# Patient Record
Sex: MALE | Race: BLACK OR AFRICAN AMERICAN | NOT HISPANIC OR LATINO | URBAN - METROPOLITAN AREA
[De-identification: names, ages, dates, MRNs, and addresses within clinical notes are randomized per-mention and may not be internally consistent; named-entity substitution may affect disease eponyms.]

---

## 2020-02-04 ENCOUNTER — EMERGENCY (EMERGENCY)
Facility: HOSPITAL | Age: 55
LOS: 1 days | Discharge: HOME | End: 2020-02-04
Attending: EMERGENCY MEDICINE | Admitting: EMERGENCY MEDICINE
Payer: SELF-PAY

## 2020-02-04 VITALS
OXYGEN SATURATION: 98 % | SYSTOLIC BLOOD PRESSURE: 126 MMHG | DIASTOLIC BLOOD PRESSURE: 80 MMHG | HEIGHT: 68 IN | WEIGHT: 184.97 LBS | RESPIRATION RATE: 18 BRPM | TEMPERATURE: 99 F | HEART RATE: 75 BPM

## 2020-02-04 PROCEDURE — 99284 EMERGENCY DEPT VISIT MOD MDM: CPT

## 2020-02-04 NOTE — ED ADULT NURSE NOTE - OBJECTIVE STATEMENT
Patient to ED c/o back pain radiating to buttocks and legs x 4-5 days. Pain is worse with walking. Denies any recent heavy lifting or falls. Pt is ambulating with cane in the ED which was prescribed for chronic leg reyes

## 2020-02-04 NOTE — ED PROVIDER NOTE - CLINICAL SUMMARY MEDICAL DECISION MAKING FREE TEXT BOX
53 y/o M presents to the ED c/o back pain radiating to buttocks and legs. Normal gait on exam. Offered to send steroids, muscle relaxers, and antiinflammatories to pharmacy, yet pt left ED s/p exam stating he did not need anything. 53 y/o M presents to the ED c/o back pain radiating to buttocks and legs. Normal gait on exam. Offered to send steroids, muscle relaxers, and antiinflammatories to pharmacy, yet pt left ED s/p exam stating he did not need anything- extensive discussion with patient explaining the care plan- px seemed satisfied- then walked out of the ED saying he did not want the pills

## 2020-02-04 NOTE — ED PROVIDER NOTE - OBJECTIVE STATEMENT
53 y/o M with hx of anxiety on Wellbutrin presents to the ED c/o back pain radiating to buttocks and legs x 4-5 days. Pain is worse with walking. Denies any recent heavy lifting or falls. Pt is ambulating with cane in the ED. Denies numbness, tingling, chest pain, shortness of breath, abdominal pain, dysuria, hematuria. 53 y/o M with hx of anxiety on Wellbutrin presents to the ED c/o back pain radiating to buttocks and legs x 4-5 days. Pain is worse with walking. Denies any recent heavy lifting or falls. Pt is ambulating with cane in the ED which was prescribed for chronic leg pain. Denies numbness, tingling, chest pain, shortness of breath, abdominal pain, dysuria, hematuria. no bowel/bladder incont  stable gait only tylenol taken for pain

## 2020-02-04 NOTE — ED PROVIDER NOTE - PATIENT PORTAL LINK FT
You can access the FollowMyHealth Patient Portal offered by [facility] by registering at the following website: [email or web address]. By joining [employer]’s FollowMyHealth portal, you will also be able to view your health information using other applications (apps) compatible with our system.

## 2020-02-04 NOTE — ED PROVIDER NOTE - CHPI ED SYMPTOMS NEG
no numbness/no tingling/no chest pain, no shortness of breath, no abdominal pain, no dysuria, no hematuria

## 2020-02-04 NOTE — ED ADULT TRIAGE NOTE - CHIEF COMPLAINT QUOTE
pt c/o lower back pain radiating to bilateral legs x4 days- denies injury, reports pain is exacerbated by movement

## 2020-02-04 NOTE — ED PROVIDER NOTE - NSFOLLOWUPINSTRUCTIONS_ED_ALL_ED_FT
Radicular Pain  Radicular pain is a type of pain that spreads from your back or neck along a spinal nerve. Spinal nerves are nerves that leave the spinal cord and go to the muscles. Radicular pain is sometimes called radiculopathy, radiculitis, or a pinched nerve. When you have this type of pain, you may also have weakness, numbness, or tingling in the area of your body that is supplied by the nerve. The pain may feel sharp and burning. Depending on which spinal nerve is affected, the pain may occur in the:  Neck area (cervical radicular pain). You may also feel pain, numbness, weakness, or tingling in the arms.Mid-spine area (thoracic radicular pain). You would feel this pain in the back and chest. This type is rare.Lower back area (lumbar radicular pain). You would feel this pain as low back pain. You may feel pain, numbness, weakness, or tingling in the buttocks or legs. Sciatica is a type of lumbar radicular pain that shoots down the back of the leg.Radicular pain occurs when one of the spinal nerves becomes irritated or squeezed (compressed). It is often caused by something pushing on a spinal nerve, such as one of the bones of the spine (vertebrae) or one of the round cushions between vertebrae (intervertebral disks). This can result from:  An injury. Wear and tear or aging of a disk. The growth of a bone spur that pushes on the nerve.Radicular pain often goes away when you follow instructions from your health care provider for relieving pain at home.  Follow these instructions at home:  Managing pain               If directed, put ice on the affected area:  Put ice in a plastic bag.Place a towel between your skin and the bag.Leave the ice on for 20 minutes, 2–3 times a day.If directed, apply heat to the affected area as often as told by your health care provider. Use the heat source that your health care provider recommends, such as a moist heat pack or a heating pad.  Place a towel between your skin and the heat source.Leave the heat on for 20–30 minutes.Remove the heat if your skin turns bright red. This is especially important if you are unable to feel pain, heat, or cold. You may have a greater risk of getting burned.Activity        Do not sit or rest in bed for long periods of time.Try to stay as active as possible. Ask your health care provider what type of exercise or activity is best for you.Avoid activities that make your pain worse, such as bending and lifting.Do not lift anything that is heavier than 10 lb (4.5 kg), or the limit that you are told, until your health care provider says that it is safe.Practice using proper technique when lifting items. Proper lifting technique involves bending your knees and rising up.Do strength and range-of-motion exercises only as told by your health care provider or physical therapist.General instructions     Take over-the-counter and prescription medicines only as told by your health care provider.Pay attention to any changes in your symptoms.Keep all follow-up visits as told by your health care provider. This is important.  Your health care provider may send you to a physical therapist to help with this pain.Contact a health care provider if:  Your pain and other symptoms get worse.Your pain medicine is not helping.Your pain has not improved after a few weeks of home care.You have a fever.Get help right away if:  You have severe pain, weakness, or numbness.You have difficulty with bladder or bowel control.Summary  Radicular pain is a type of pain that spreads from your back or neck along a spinal nerve.When you have radicular pain, you may also have weakness, numbness, or tingling in the area of your body that is supplied by the nerve.The pain may feel sharp or burning.Radicular pain may be treated with ice, heat, medicines, or physical therapy.This information is not intended to replace advice given to you by your health care provider. Make sure you discuss any questions you have with your health care provider.    Document Released: 01/25/2006 Document Revised: 07/02/2019 Document Reviewed: 07/02/2019  [employer] Patient Education © 2019 [employer]

## 2020-02-10 DIAGNOSIS — Z87.891 PERSONAL HISTORY OF NICOTINE DEPENDENCE: ICD-10-CM

## 2020-02-10 DIAGNOSIS — M54.5 LOW BACK PAIN: ICD-10-CM

## 2020-02-10 DIAGNOSIS — M54.9 DORSALGIA, UNSPECIFIED: ICD-10-CM

## 2024-04-18 ENCOUNTER — EMERGENCY (EMERGENCY)
Facility: HOSPITAL | Age: 59
LOS: 1 days | Discharge: HOME | End: 2024-04-18
Attending: EMERGENCY MEDICINE | Admitting: EMERGENCY MEDICINE
Payer: SELF-PAY

## 2024-04-18 VITALS
HEART RATE: 91 BPM | DIASTOLIC BLOOD PRESSURE: 79 MMHG | SYSTOLIC BLOOD PRESSURE: 112 MMHG | TEMPERATURE: 98 F | RESPIRATION RATE: 16 BRPM | WEIGHT: 190.04 LBS | OXYGEN SATURATION: 96 %

## 2024-04-18 PROCEDURE — 99283 EMERGENCY DEPT VISIT LOW MDM: CPT

## 2024-04-18 NOTE — ED PROVIDER NOTE - CLINICAL SUMMARY MEDICAL DECISION MAKING FREE TEXT BOX
Pt states he fell out after using fentanyl.  States he had not used in 4 months but used today.  No head injury, no CP, no SOB.    Patient with steady gait, oriented to person place and time of day and states he has Narcan kits at home.  SBIRT offered.

## 2024-04-18 NOTE — ED ADULT NURSE NOTE - NSFALLUNIVINTERV_ED_ALL_ED
Bed/Stretcher in lowest position, wheels locked, appropriate side rails in place/Call bell, personal items and telephone in reach/Instruct patient to call for assistance before getting out of bed/chair/stretcher/Non-slip footwear applied when patient is off stretcher/[place] to call system/Physically safe environment - no spills, clutter or unnecessary equipment/Purposeful proactive rounding/Room/bathroom lighting operational, light cord in reach

## 2024-04-18 NOTE — ED ADULT NURSE NOTE - OBJECTIVE STATEMENT
Pt is a 58y male c/o overdose. Pt was given narcan via IVP 0.5 mg by EMS PTA. admits to fentanyl use. arrives to ed AAOx4, even respirations. was initially nauseous but reports improvement after vomiting and denies nausea currently. Ambulatory w/ steady gait. Denies pain.

## 2024-04-18 NOTE — ED ADULT TRIAGE NOTE - CHIEF COMPLAINT QUOTE
BIBEMS from adult store for overdose. was given narcan via IVP 0.5 mg IV. admits to fentanyl use. arrives to ed awake and alert, even respirations. was initially nauseous but reports improvement after vomiting and denies nausea currently  abrasion noted to right shoulder, pt denies head pain

## 2024-04-18 NOTE — ED PROVIDER NOTE - PATIENT PORTAL LINK FT
You can access the FollowMyHealth Patient Portal offered by [facility] by registering at the following website: [email or web address]. By joining [employer]’s FollowMyHealth portal, you will also be able to view your health information using other applications (apps) compatible with our system.

## 2024-04-18 NOTE — ED PROVIDER NOTE - OBJECTIVE STATEMENT
Pt states he fell out after using fentanyl.  States he had not used in 4 months but used today.  No head injury, no CP, no SOB.

## 2024-04-19 PROBLEM — F41.9 ANXIETY DISORDER, UNSPECIFIED: Chronic | Status: ACTIVE | Noted: 2020-02-04

## 2024-04-22 DIAGNOSIS — R41.82 ALTERED MENTAL STATUS, UNSPECIFIED: ICD-10-CM

## 2024-04-22 DIAGNOSIS — T40.411A POISONING BY FENTANYL OR FENTANYL ANALOGS, ACCIDENTAL (UNINTENTIONAL), INITIAL ENCOUNTER: ICD-10-CM

## 2024-05-23 ENCOUNTER — APPOINTMENT (OUTPATIENT)
Dept: ORTHOPEDIC SURGERY | Facility: CLINIC | Age: 59
End: 2024-05-23

## 2024-05-23 PROBLEM — Z00.00 ENCOUNTER FOR PREVENTIVE HEALTH EXAMINATION: Status: ACTIVE | Noted: 2024-05-23

## 2024-05-30 ENCOUNTER — APPOINTMENT (OUTPATIENT)
Dept: ORTHOPEDIC SURGERY | Facility: CLINIC | Age: 59
End: 2024-05-30

## 2024-05-30 ENCOUNTER — RESULT REVIEW (OUTPATIENT)
Age: 59
End: 2024-05-30

## 2024-05-30 ENCOUNTER — OUTPATIENT (OUTPATIENT)
Dept: OUTPATIENT SERVICES | Facility: HOSPITAL | Age: 59
LOS: 1 days | End: 2024-05-30
Payer: MEDICAID

## 2024-05-30 VITALS
OXYGEN SATURATION: 99 % | DIASTOLIC BLOOD PRESSURE: 88 MMHG | HEART RATE: 60 BPM | SYSTOLIC BLOOD PRESSURE: 149 MMHG | HEIGHT: 68 IN | WEIGHT: 194 LBS | BODY MASS INDEX: 29.4 KG/M2

## 2024-05-30 DIAGNOSIS — Z87.891 PERSONAL HISTORY OF NICOTINE DEPENDENCE: ICD-10-CM

## 2024-05-30 DIAGNOSIS — F17.200 NICOTINE DEPENDENCE, UNSPECIFIED, UNCOMPLICATED: ICD-10-CM

## 2024-05-30 PROCEDURE — 72110 X-RAY EXAM L-2 SPINE 4/>VWS: CPT | Mod: 26

## 2024-05-30 PROCEDURE — 99202 OFFICE O/P NEW SF 15 MIN: CPT

## 2024-05-30 PROCEDURE — 72110 X-RAY EXAM L-2 SPINE 4/>VWS: CPT

## 2024-05-30 NOTE — HISTORY OF PRESENT ILLNESS
[de-identified] : 58-year-old M with Hx of rhabdomyolysis this year requiring 1 month of dialysis  presenting today with back pain w/ no sydnie x1 month Patient at this time denies radicular pain  Patient at baseline ambulates without assistive devices  Currently denies numbness tingling down legs, denies radiating pain. Denies saddle anesthesia, denies incontinence of bladder and bowels.   PMHx -  Denies PSx -  Denies Social (-)Tobacco (-)EtOh (-)Elicit substance

## 2024-05-30 NOTE — PHYSICAL EXAM
[de-identified] : General: Pt Alert and oriented, NAD  ttp - paraspinous muscles Bilat LE Sensation: SILT L2-S1  Motor:  L2 (hip flexion)  5/5   L3 (knee extension)  5/5   L4 (ankle dorsiflexion)  5/5   L5 (long toe extension) 5/5   S1 (ankle plantar flexion) 4/5

## 2024-05-30 NOTE — ASSESSMENT
[FreeTextEntry1] : 58 w/ Low back mostly likely due to muscle spasms  -No acute orthopedic surgical intervention at this time -Conservative tx -Flexeril -Meloxicam -F/u in 4 weeks to monitor for improvement

## 2025-05-17 ENCOUNTER — EMERGENCY (EMERGENCY)
Facility: HOSPITAL | Age: 60
LOS: 1 days | End: 2025-05-17
Attending: EMERGENCY MEDICINE | Admitting: EMERGENCY MEDICINE
Payer: MEDICAID

## 2025-05-17 VITALS
TEMPERATURE: 97 F | RESPIRATION RATE: 16 BRPM | DIASTOLIC BLOOD PRESSURE: 86 MMHG | HEART RATE: 98 BPM | SYSTOLIC BLOOD PRESSURE: 123 MMHG | OXYGEN SATURATION: 96 %

## 2025-05-17 PROCEDURE — 70450 CT HEAD/BRAIN W/O DYE: CPT | Mod: 26

## 2025-05-17 PROCEDURE — 99284 EMERGENCY DEPT VISIT MOD MDM: CPT | Mod: 25

## 2025-05-17 PROCEDURE — 70486 CT MAXILLOFACIAL W/O DYE: CPT | Mod: 26

## 2025-05-17 PROCEDURE — 72125 CT NECK SPINE W/O DYE: CPT | Mod: 26

## 2025-05-17 RX ORDER — ONDANSETRON HCL/PF 4 MG/2 ML
4 VIAL (ML) INJECTION ONCE
Refills: 0 | Status: COMPLETED | OUTPATIENT
Start: 2025-05-17 | End: 2025-05-17

## 2025-05-17 RX ORDER — BACITRACIN 500 UNIT/G
1 OINTMENT (GRAM) TOPICAL ONCE
Refills: 0 | Status: COMPLETED | OUTPATIENT
Start: 2025-05-17 | End: 2025-05-17

## 2025-05-17 RX ORDER — ACETAMINOPHEN 500 MG/5ML
975 LIQUID (ML) ORAL ONCE
Refills: 0 | Status: COMPLETED | OUTPATIENT
Start: 2025-05-17 | End: 2025-05-17

## 2025-05-17 RX ORDER — CLOSTRIDIUM TETANI TOXOID ANTIGEN (FORMALDEHYDE INACTIVATED), CORYNEBACTERIUM DIPHTHERIAE TOXOID ANTIGEN (FORMALDEHYDE INACTIVATED), BORDETELLA PERTUSSIS TOXOID ANTIGEN (GLUTARALDEHYDE INACTIVATED), BORDETELLA PERTUSSIS FILAMENTOUS HEMAGGLUTININ ANTIGEN (FORMALDEHYDE INACTIVATED), BORDETELLA PERTUSSIS PERTACTIN ANTIGEN, AND BORDETELLA PERTUSSIS FIMBRIAE 2/3 ANTIGEN 5; 2; 2.5; 5; 3; 5 [LF]/.5ML; [LF]/.5ML; UG/.5ML; UG/.5ML; UG/.5ML; UG/.5ML
0.5 INJECTION, SUSPENSION INTRAMUSCULAR ONCE
Refills: 0 | Status: COMPLETED | OUTPATIENT
Start: 2025-05-17 | End: 2025-05-17

## 2025-05-17 RX ADMIN — Medication 4 MILLIGRAM(S): at 05:48

## 2025-05-17 RX ADMIN — Medication 1 APPLICATION(S): at 05:48

## 2025-05-17 RX ADMIN — Medication 975 MILLIGRAM(S): at 03:37

## 2025-05-17 RX ADMIN — CLOSTRIDIUM TETANI TOXOID ANTIGEN (FORMALDEHYDE INACTIVATED), CORYNEBACTERIUM DIPHTHERIAE TOXOID ANTIGEN (FORMALDEHYDE INACTIVATED), BORDETELLA PERTUSSIS TOXOID ANTIGEN (GLUTARALDEHYDE INACTIVATED), BORDETELLA PERTUSSIS FILAMENTOUS HEMAGGLUTININ ANTIGEN (FORMALDEHYDE INACTIVATED), BORDETELLA PERTUSSIS PERTACTIN ANTIGEN, AND BORDETELLA PERTUSSIS FIMBRIAE 2/3 ANTIGEN 0.5 MILLILITER(S): 5; 2; 2.5; 5; 3; 5 INJECTION, SUSPENSION INTRAMUSCULAR at 03:37

## 2025-05-17 NOTE — ED ADULT TRIAGE NOTE - CHIEF COMPLAINT QUOTE
Pt BIBA c/o drug overdose. Pt admits to fentanyl use tonight. Pt arrives with abrasion to forehead and back of head. Per EMS, pt was unconscious and decr respirations. Pt received 2mg IN narcan and 1mg IV narcan with +response. Pt also endorsing abdominal pain and neck pain. Denies blood thinner use. C-collar in place by EMS. Pt has 18g to left AC placed prehospital.

## 2025-05-17 NOTE — ED PROVIDER NOTE - CARE PLAN
1 Principal Discharge DX:	Overdose of fentanyl  Secondary Diagnosis:	Scalp contusion  Secondary Diagnosis:	Neck sprain  Secondary Diagnosis:	Lung nodule

## 2025-05-17 NOTE — ED PROVIDER NOTE - ATTENDING APP SHARED VISIT CONTRIBUTION OF CARE
59-year-old male with a history of hypertension and substance use was brought in by emergency services for altered mental status and trauma after using fentanyl. The patient responded positively to administered naloxone. He presented with abrasions on the forehead and back of the head but reported no other injuries or symptoms. Physical examination revealed tenderness in the scalp and neck. Further imaging showed no acute intracranial pathology or acute fractures but identified a lung nodule and emphysema. He was monitored for stability and improved under treatment, including receiving acetaminophen and diphtheria/tetanus/pertussis vaccine. He was discharged with a narcan kit and follow-up instructions to address his lung nodule and substance abuse.    I saw and evaluated the patient. I discussed the case with the BENJAMIN and agree with the findings and helped develop the plan of care as documented in the BENJAMIN’s note. I agree with the findings and plan of care as documented in the BENJAMIN’s note.

## 2025-05-17 NOTE — ED PROVIDER NOTE - NSFOLLOWUPINSTRUCTIONS_ED_ALL_ED_FT
Substance Abuse    Chemical dependency is an addiction to drugs or alcohol. It is characterized by the repeated behavior of seeking out and using drugs and alcohol despite harmful consequences to the health and safety of oneself and others. Using drugs in a manner that brought you to an Emergency Room suggests you may have an drug abuse problem. Seek help at a drug addiction center.    SEEK IMMEDIATE MEDICAL CARE IF YOU HAVE ANY OF THE FOLLOWING SYMPTOMS: chest pain, shortness of breath, change in mental status, thoughts about hurting killing yourself, thoughts about hurting or killing somebody else, hallucinations, or worsening depression. Substance Abuse    Chemical dependency is an addiction to drugs or alcohol. It is characterized by the repeated behavior of seeking out and using drugs and alcohol despite harmful consequences to the health and safety of oneself and others. Using drugs in a manner that brought you to an Emergency Room suggests you may have an drug abuse problem. Seek help at a drug addiction center.    SEEK IMMEDIATE MEDICAL CARE IF YOU HAVE ANY OF THE FOLLOWING SYMPTOMS: chest pain, shortness of breath, change in mental status, thoughts about hurting killing yourself, thoughts about hurting or killing somebody else, hallucinations, or worsening depression.    Lung nodules are small growths or spots in the lungs that are often found incidentally during imaging studies. They can vary in size, shape, and density and may result from various conditions, including infections, inflammations, or neoplasms. Discharge instructions for lung nodules focus on monitoring and determining the nature of these nodules, whether benign or potentially malignant. Here are general instructions you might receive:    Monitoring and Follow-Up:    Attend all scheduled follow-up appointments with your healthcare provider to evaluate the lung nodules. These may include repeat imaging studies, such as chest X-rays or CT scans, to monitor any changes in size or appearance.  Discuss any further diagnostic tests, such as a PET scan or a biopsy, which your healthcare provider may recommend for more detailed evaluation.  Report Symptoms:    Be vigilant about new or worsening symptoms, such as persistent cough, chest pain, shortness of breath, unexplained weight loss, or fatigue. Report these to your healthcare provider promptly.  Keep track of any respiratory changes, even if they seem minor, and discuss them during follow-up visits.  Lifestyle and Health Management:    Avoid smoking and exposure to secondhand smoke, as tobacco use can worsen lung conditions and impact lung health.  Maintain a healthy lifestyle, including a balanced diet and regular exercise, to support overall lung function and health.  Follow any specific recommendations given by your healthcare provider to manage underlying health conditions that may relate to lung health.  Medication Adherence:    Take any prescribed medications to manage related symptoms or conditions, such as inhalers for asthma or antibiotics for infections, as directed.  Specialist Referral:    You may be referred to a pulmonologist (lung specialist) or an oncologist if further evaluation or specialized treatment is necessary.  Avoid Respiratory Irritants:    Minimize exposure to environmental pollutants, chemical fumes, or allergens that could aggravate your lungs.

## 2025-05-17 NOTE — ED PROVIDER NOTE - CLINICAL SUMMARY MEDICAL DECISION MAKING FREE TEXT BOX
60 yo M with PMHx of HTN, substance use, not on any AC/ASA, last tetanus unknown, BIBA for AMS and trauma. Pt admits to fentanyl use tonight, found bradypneic with AMS and trauma with unknown mechanism. Pt arrives with abrasion to forehead and back of head, Pt received 2mg IN narcan and 1mg IV narcan with appropriate response.   FS wnl, pt  fully disrobed and noted isolated face/head/neck trauma, CT with no acute fx or ICH noted. incidental findings of a 1.3 x 0.8 cm nodule right lung apex.   Biapical paraseptal emphysema. Results, ddx, incidental findings, and f/u plans discussed in length with pt at bedside. AFVSS, pt is non-toxic appearing and has remained stable throughout the stay after ED interventions. monitored in the ED with stable mental status and clinically sober for dc.drug cessation and counseling provided, given narcan kit, transportation established for safe discharge home. prompt return to ED for any worsening or new sx, pt verbalized understanding.

## 2025-05-17 NOTE — ED PROVIDER NOTE - PHYSICAL EXAMINATION
Gen - WDWN, NAD, comfortable and non-toxic appearing, speaking in full sentences, phonating well   Skin - warm, dry, +abrasion to the L forehead and scalp region, no laceration noted   HEENT - NC, +scalp hematoma on the L, TTP, no crepitus, PERRL, pupils symmetric b/l, 3mm b/l, MMM, no nasal discharge, +TTP, airway patent, neck supple with paraspinal tenderness in the cervical region   CV - S1S2, R/R/R  Resp - CTAB, no r/r/w  GI - soft, ND, NT, no CVAT b/l   MS - W/W/P, no C/C/E, No acute or gross deformities noted to extremities. No midline spinal tenderness or step off on palpation  Neuro - AxOx3, no focal neuro deficits, unable to ambulate due to clinical condition currently

## 2025-05-17 NOTE — ED PROVIDER NOTE - OBJECTIVE STATEMENT
60 yo M with PMHx of HTN, substance use, not on any AC/ASA, last tetanus unknown, BIBA for AMS and trauma. Pt admits to fentanyl use tonight, found bradypneic with AMS and trauma with unknown mechanism. Pt arrives with abrasion to forehead and back of head, Pt received 2mg IN narcan and 1mg IV narcan with appropriate response. 60 yo M with PMHx of HTN, substance use, not on any AC/ASA, last tetanus unknown, BIBA for AMS and trauma. Pt admits to fentanyl use tonight, found bradypneic with AMS and trauma with unknown mechanism. Pt arrives with abrasion to forehead and back of head, Pt received 2mg IN narcan and 1mg IV narcan with appropriate response. Reports pain to the neck and face region. Denies fever, chills, LOC, CP, SOB, palpitations, N/V/D/C, abdominal pain, focal weakness, incontinence, seizure activities, rash, and paresthesia.  No other co-ingestion reported

## 2025-05-17 NOTE — ED PROVIDER NOTE - CARE PROVIDERS DIRECT ADDRESSES
[email or web address]

## 2025-05-17 NOTE — ED PROVIDER NOTE - CARE PROVIDER_API CALL
[facility]  Pulmonary Disease  [address] [place] [id number]  Phone: [phone]  Fax: [phone]  Follow Up Time:     Jairo Kwong  Internal Medicine  [address] [place] [address]  Phone: [phone]  Fax: [phone]  Follow Up Time:

## 2025-05-17 NOTE — ED PROVIDER NOTE - PATIENT PORTAL LINK FT
You can access the FollowMyHealth Patient Portal offered by [facility] by registering at the following website: [email or web address]. By joining [employer]’s FollowMyHealth portal, you will also be able to view your health information using other applications (apps) compatible with our system.

## 2025-05-17 NOTE — ED PROVIDER NOTE - PROGRESS NOTE DETAILS
Pt reassessed at bedside with no new or relapse of sx noted. Resting comfortably with no acute distress noted. ambulatory in the ED with steady gait  Pt's old chart - alternative MRN [id number]

## 2025-05-20 DIAGNOSIS — T40.411A POISONING BY FENTANYL OR FENTANYL ANALOGS, ACCIDENTAL (UNINTENTIONAL), INITIAL ENCOUNTER: ICD-10-CM

## 2025-05-20 DIAGNOSIS — S00.03XA CONTUSION OF SCALP, INITIAL ENCOUNTER: ICD-10-CM

## 2025-05-20 DIAGNOSIS — R41.82 ALTERED MENTAL STATUS, UNSPECIFIED: ICD-10-CM
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
## 2025-05-20 DIAGNOSIS — I10 ESSENTIAL (PRIMARY) HYPERTENSION: ICD-10-CM

## 2025-05-20 DIAGNOSIS — R91.1 SOLITARY PULMONARY NODULE: ICD-10-CM

## 2025-05-20 DIAGNOSIS — S13.9XXA SPRAIN OF JOINTS AND LIGAMENTS OF UNSPECIFIED PARTS OF NECK, INITIAL ENCOUNTER: ICD-10-CM
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
## 2025-05-20 DIAGNOSIS — Z23 ENCOUNTER FOR IMMUNIZATION: ICD-10-CM

## 2025-05-20 DIAGNOSIS — S00.81XA ABRASION OF OTHER PART OF HEAD, INITIAL ENCOUNTER: ICD-10-CM

## 2025-06-02 ENCOUNTER — APPOINTMENT (OUTPATIENT)
Dept: PULMONOLOGY | Facility: CLINIC | Age: 60
End: 2025-06-02
Payer: MEDICAID

## 2025-06-02 ENCOUNTER — NON-APPOINTMENT (OUTPATIENT)
Age: 60
End: 2025-06-02

## 2025-06-02 VITALS
HEIGHT: 64 IN | BODY MASS INDEX: 31.58 KG/M2 | SYSTOLIC BLOOD PRESSURE: 104 MMHG | TEMPERATURE: 98.4 F | WEIGHT: 185 LBS | OXYGEN SATURATION: 99 % | HEART RATE: 78 BPM | DIASTOLIC BLOOD PRESSURE: 71 MMHG

## 2025-06-02 DIAGNOSIS — F17.200 NICOTINE DEPENDENCE, UNSPECIFIED, UNCOMPLICATED: ICD-10-CM

## 2025-06-02 PROCEDURE — 99406 BEHAV CHNG SMOKING 3-10 MIN: CPT

## 2025-06-02 PROCEDURE — G0296 VISIT TO DETERM LDCT ELIG: CPT

## 2025-06-02 PROCEDURE — 99204 OFFICE O/P NEW MOD 45 MIN: CPT | Mod: 25

## 2025-06-02 RX ORDER — NICOTINE 21 MG/24HR
14 PATCH, TRANSDERMAL 24 HOURS TRANSDERMAL DAILY
Qty: 28 | Refills: 2 | Status: ACTIVE | COMMUNITY
Start: 2025-06-02 | End: 1900-01-01

## 2025-06-02 RX ORDER — AMLODIPINE BESYLATE 10 MG/1
10 TABLET ORAL
Refills: 0 | Status: ACTIVE | COMMUNITY

## 2025-06-02 RX ORDER — LORATADINE 10 MG/1
CAPSULE, LIQUID FILLED ORAL
Refills: 0 | Status: ACTIVE | COMMUNITY

## 2025-06-02 RX ORDER — HYDRALAZINE HYDROCHLORIDE 25 MG/1
25 TABLET ORAL
Refills: 0 | Status: ACTIVE | COMMUNITY

## 2025-06-02 RX ORDER — ARIPIPRAZOLE 20 MG/1
20 TABLET ORAL
Refills: 0 | Status: ACTIVE | COMMUNITY
Start: 2025-06-02

## 2025-06-05 ENCOUNTER — NON-APPOINTMENT (OUTPATIENT)
Age: 60
End: 2025-06-05

## 2025-06-05 VITALS — WEIGHT: 188 LBS | BODY MASS INDEX: 28.49 KG/M2 | HEIGHT: 68 IN

## 2025-06-05 DIAGNOSIS — F17.210 NICOTINE DEPENDENCE, CIGARETTES, UNCOMPLICATED: ICD-10-CM

## 2025-06-06 ENCOUNTER — TRANSCRIPTION ENCOUNTER (OUTPATIENT)
Age: 60
End: 2025-06-06

## 2025-06-19 ENCOUNTER — APPOINTMENT (OUTPATIENT)
Dept: CT IMAGING | Facility: HOSPITAL | Age: 60
End: 2025-06-19

## 2025-06-19 ENCOUNTER — OUTPATIENT (OUTPATIENT)
Dept: OUTPATIENT SERVICES | Facility: HOSPITAL | Age: 60
LOS: 1 days | End: 2025-06-19
Payer: MEDICAID

## 2025-06-19 PROCEDURE — 71271 CT THORAX LUNG CANCER SCR C-: CPT | Mod: 26

## 2025-06-19 PROCEDURE — 71271 CT THORAX LUNG CANCER SCR C-: CPT

## 2025-06-23 ENCOUNTER — APPOINTMENT (OUTPATIENT)
Dept: PULMONOLOGY | Facility: CLINIC | Age: 60
End: 2025-06-23
Payer: MEDICAID

## 2025-06-23 VITALS
SYSTOLIC BLOOD PRESSURE: 124 MMHG | DIASTOLIC BLOOD PRESSURE: 84 MMHG | TEMPERATURE: 98.5 F | HEIGHT: 68 IN | WEIGHT: 193 LBS | OXYGEN SATURATION: 98 % | BODY MASS INDEX: 29.25 KG/M2 | HEART RATE: 69 BPM

## 2025-06-23 PROCEDURE — 94726 PLETHYSMOGRAPHY LUNG VOLUMES: CPT

## 2025-06-23 PROCEDURE — ZZZZZ: CPT

## 2025-06-23 PROCEDURE — 94729 DIFFUSING CAPACITY: CPT

## 2025-06-23 PROCEDURE — 99214 OFFICE O/P EST MOD 30 MIN: CPT | Mod: 25

## 2025-06-23 PROCEDURE — 94060 EVALUATION OF WHEEZING: CPT

## 2025-06-27 ENCOUNTER — APPOINTMENT (OUTPATIENT)
Dept: PULMONOLOGY | Facility: CLINIC | Age: 60
End: 2025-06-27
Payer: MEDICAID

## 2025-06-27 VITALS
SYSTOLIC BLOOD PRESSURE: 116 MMHG | TEMPERATURE: 96.7 F | DIASTOLIC BLOOD PRESSURE: 75 MMHG | OXYGEN SATURATION: 98 % | HEIGHT: 68.5 IN | BODY MASS INDEX: 28.17 KG/M2 | HEART RATE: 70 BPM | WEIGHT: 188 LBS

## 2025-06-27 PROCEDURE — 99212 OFFICE O/P EST SF 10 MIN: CPT | Mod: 25

## 2025-06-27 PROCEDURE — G0296 VISIT TO DETERM LDCT ELIG: CPT

## 2025-07-16 NOTE — ED ADULT NURSE NOTE - CAS EDN DISCHARGE ASSESSMENT
Sports Medicine / Non-Operative Orthopedics ([facility]):  Thank you for allowing us the opportunity to participate in the care of your patient.   \"SERVICE TO SPORTS MEDICINE\" - your one click referral for all ages and types of orthopedic care.  (If after review of the referral it is determined that surgery is the next best treatment course, the sports medicine team will direct it to the appropriate orthopedic surgeon)  For questions regarding referral please call Jyots[facility] Oden Sports Medicine Referral Specialist @ [phone]   - Dr. Richard Salcedo, DO Internal Medicine/Pediatric Sports Medicine ([facility]) 
Alert and oriented to person, place and time